# Patient Record
(demographics unavailable — no encounter records)

---

## 2024-10-25 NOTE — DISCUSSION/SUMMARY
[FreeTextEntry1] : 60-year-old male with history of SABINA compliant with CPAP use.  Continued compliance with CPAP use was stressed.  A compliance report will be obtained from his DME company (he is to call with the info regarding his DME company).  Smoking cessation was stressed.  PFTs will be performed in the future.  Follow-up chest CT as planned.  He is to follow-up with his PMD as before.

## 2024-10-25 NOTE — HISTORY OF PRESENT ILLNESS
[Current] : current [>= 20 pack years] : >= 20 pack years [TextBox_4] : 60-year-old male with history of SABINA for 5 years compliant with CPAP use via full facemask presents for pulmonary evaluation.  Patient denies sleep-related complaints.  He has a 30-pack-year history of smoking continues to smoke 1 pack/day.  He denies prior pulmonary problems or treatment.  He is scheduled for low-dose screening chest CT next week ordered by his PMD. [TextBox_11] : 1 [TextBox_13] : 30 [TextBox_29] : Denies snoring, daytime somnolence, apneic episodes, AM headaches

## 2024-12-28 NOTE — PROCEDURE
[FreeTextEntry1] : PFT results: Mild OAD with a bronchodilator response.  Normal lung volumes and diffusion.

## 2024-12-28 NOTE — HISTORY OF PRESENT ILLNESS
[Current] : current [>= 20 pack years] : >= 20 pack years [TextBox_4] : 61-year-old male with history of SABINA compliant with CPAP use via full facemask, presents for follow-up.  Patient denies sleep-related complaints. He is filing a "grievance" through his union with the [facility] because they are asking for a repeat sleep study.  He continues to smoke 1 pack daily.He denies cough, SOB, hemoptysis, night sweats or weight loss.  [TextBox_11] : 1 [TextBox_13] : 30 [TextBox_29] : Denies snoring, daytime somnolence, apneic episodes, AM headaches

## 2024-12-28 NOTE — HISTORY OF PRESENT ILLNESS
[Current] : current [>= 20 pack years] : >= 20 pack years [TextBox_4] : 61-year-old male with history of SABINA compliant with CPAP use via full facemask, presents for follow-up.  Patient denies sleep-related complaints. He is filing a "grievance" through his union with the [facility] because they are asking for a repeat sleep study.  He continues to smoke 1 pack daily.He denies cough, SOB, hemoptysis, night sweats or weight loss.  [TextBox_11] : 1 [TextBox_13] : 30 [TextBox_29] : Denies snoring, daytime somnolence, apneic episodes, AM headaches

## 2024-12-28 NOTE — DISCUSSION/SUMMARY
[FreeTextEntry1] : 61-year-old male with history of SABINA compliant with CPAP use.  The patient appears to be benefiting from CPAP use.  Prior compliance report shows adequate compliance with CPAP use.  Continued treatment was stressed.  At present there is no need for a repeat sleep study.  Follow-up compliance report will be obtained.  I reviewed the PFT results with the patient.  Smoking cessation was stressed.  Albuterol to be used as needed was prescribed.  A low-dose screening chest CT will be performed (ordered by his PMD).  He is to follow-up with his PMD as before.